# Patient Record
Sex: MALE | Race: WHITE | NOT HISPANIC OR LATINO | URBAN - METROPOLITAN AREA
[De-identification: names, ages, dates, MRNs, and addresses within clinical notes are randomized per-mention and may not be internally consistent; named-entity substitution may affect disease eponyms.]

---

## 2017-01-31 ENCOUNTER — IMPORTED ENCOUNTER (OUTPATIENT)
Dept: URBAN - METROPOLITAN AREA CLINIC 9 | Facility: CLINIC | Age: 77
End: 2017-01-31

## 2017-06-14 ENCOUNTER — IMPORTED ENCOUNTER (OUTPATIENT)
Dept: URBAN - METROPOLITAN AREA CLINIC 9 | Facility: CLINIC | Age: 77
End: 2017-06-14

## 2017-12-06 ENCOUNTER — IMPORTED ENCOUNTER (OUTPATIENT)
Dept: URBAN - METROPOLITAN AREA CLINIC 9 | Facility: CLINIC | Age: 77
End: 2017-12-06

## 2018-02-27 ENCOUNTER — IMPORTED ENCOUNTER (OUTPATIENT)
Dept: URBAN - METROPOLITAN AREA CLINIC 9 | Facility: CLINIC | Age: 78
End: 2018-02-27

## 2018-03-13 ENCOUNTER — IMPORTED ENCOUNTER (OUTPATIENT)
Dept: URBAN - METROPOLITAN AREA CLINIC 9 | Facility: CLINIC | Age: 78
End: 2018-03-13

## 2018-03-28 ENCOUNTER — IMPORTED ENCOUNTER (OUTPATIENT)
Dept: URBAN - METROPOLITAN AREA CLINIC 9 | Facility: CLINIC | Age: 78
End: 2018-03-28

## 2018-12-12 ENCOUNTER — IMPORTED ENCOUNTER (OUTPATIENT)
Dept: URBAN - METROPOLITAN AREA CLINIC 9 | Facility: CLINIC | Age: 78
End: 2018-12-12

## 2019-12-11 ENCOUNTER — IMPORTED ENCOUNTER (OUTPATIENT)
Dept: URBAN - METROPOLITAN AREA CLINIC 9 | Facility: CLINIC | Age: 79
End: 2019-12-11

## 2020-08-26 ENCOUNTER — IMPORTED ENCOUNTER (OUTPATIENT)
Dept: URBAN - METROPOLITAN AREA CLINIC 9 | Facility: CLINIC | Age: 80
End: 2020-08-26

## 2021-09-01 ENCOUNTER — IMPORTED ENCOUNTER (OUTPATIENT)
Dept: URBAN - METROPOLITAN AREA CLINIC 9 | Facility: CLINIC | Age: 81
End: 2021-09-01

## 2021-10-05 ENCOUNTER — PRE-OP/H&P (OUTPATIENT)
Dept: URBAN - METROPOLITAN AREA CLINIC 14 | Facility: CLINIC | Age: 81
End: 2021-10-05

## 2021-10-05 DIAGNOSIS — H25.13: ICD-10-CM

## 2021-10-05 PROCEDURE — 92136 OPHTHALMIC BIOMETRY: CPT

## 2021-10-05 PROCEDURE — 99211PRE PRE OP VISIT

## 2021-10-19 ASSESSMENT — VISUAL ACUITY
OD_SC: 20/50 -2 SN
OS_CC: 20/30 -2 SN
OS_CC: 20/25 SN
OS_CC: 20/25 + SN
OD_CC: 20/30 SN
OD_CC: 20/25 SN
OS_SC: 20/80 SN
OS_CC: 20/25 SN
OS_CC: 20/30 + SN
OS_CC: 20/20 -2 SN
OS_SC: 20/20 - SN
OS_SC: 20/25 SN
OS_CC: 20/30 -2 SN
OD_CC: 20/20 -2 SN
OD_CC: 20/25 - SN
OD_SC: 20/60 SN
OD_CC: 20/20 SN
OD_CC: 20/40 SN
OD_SC: 20/400 SN
OD_SC: 20/60 SN
OD_CC: 20/60 SN
OD_CC: 20/25 + SN
OD_SC: 20/50 -2 SN
OD_PH: 20/20 SN
OS_SC: 20/20 - SN
OS_SC: 20/25 SN
OS_CC: 20/30 + SN
OS_CC: 20/40 SN
OS_CC: 20/20 -2 SN
OD_CC: 20/30 SN
OS_PH: 20/20 - SN
OD_CC: 20/30 SN

## 2021-10-19 ASSESSMENT — KERATOMETRY
OD_AXISANGLE2_DEGREES: 177
OD_AXISANGLE_DEGREES: 87
OD_K2POWER_DIOPTERS: 43.25
OS_AXISANGLE_DEGREES: 180
OD_K1POWER_DIOPTERS: 42
OS_AXISANGLE2_DEGREES: 90
OS_K1POWER_DIOPTERS: 42.75
OS_K2POWER_DIOPTERS: 42.75

## 2021-10-19 ASSESSMENT — TONOMETRY
OD_IOP_MMHG: 14
OS_IOP_MMHG: 11
OS_IOP_MMHG: 15
OS_IOP_MMHG: 15
OD_IOP_MMHG: 16
OS_IOP_MMHG: 15
OD_IOP_MMHG: 18
OS_IOP_MMHG: 19
OD_IOP_MMHG: 14
OS_IOP_MMHG: 18
OD_IOP_MMHG: 17
OS_IOP_MMHG: 13
OS_IOP_MMHG: 18
OD_IOP_MMHG: 15
OD_IOP_MMHG: 18
OD_IOP_MMHG: 14

## 2021-10-28 NOTE — PATIENT DISCUSSION
Refractive Counseling: I have discussed the options for refractive surgery to decrease dependency on glasses and/or contact lenses. These options include LASIK, PRK / ASA, ICL, RLE. It was emphasized to the patient that the goal of reducing spectacle dependence not spectacle freedom is more realistic. The patient understands it is possible they will still need a weak spectacle prescription and/or a LASIK enhancement to achieve visual target.

## 2021-11-05 ENCOUNTER — 1 DAY POST-OP (OUTPATIENT)
Dept: URBAN - METROPOLITAN AREA CLINIC 9 | Facility: CLINIC | Age: 81
End: 2021-11-05

## 2021-11-05 DIAGNOSIS — Z96.1: ICD-10-CM

## 2021-11-05 DIAGNOSIS — H25.12: ICD-10-CM

## 2021-11-05 PROCEDURE — 99024 POSTOP FOLLOW-UP VISIT: CPT

## 2021-11-05 PROCEDURE — 92136 OPHTHALMIC BIOMETRY: CPT

## 2021-11-05 ASSESSMENT — TONOMETRY: OD_IOP_MMHG: 20

## 2021-11-05 ASSESSMENT — VISUAL ACUITY: OD_SC: 20/20-1

## 2021-11-11 NOTE — PATIENT DISCUSSION
It was discussed that after refractive surgery, a certain number of patients experience glare, a star-bursting or halo effect around lights, or other low-light vision problems that may interfere with the ability to drive at night or see well in dim light. Although there are several possible causes for these difficulties to include pupils of any size, the risk may be increased in patients with large pupils or high degrees of correction. For most patients this is a temporary condition that diminishes with time or is correctable by wearing glasses at night or taking eye drops. For some patients, however, these visual problems are permanent.

## 2021-11-11 NOTE — PATIENT DISCUSSION
Discussed options of refractive surgery vs. glasses/contacts vs. follow. Patient understands options and desires to proceed with LASIK OU to reduce dependency on glasses.

## 2021-11-17 NOTE — PATIENT DISCUSSION
Good postoperative appearance. Graft Cartilage Fenestration Text: The cartilage was fenestrated with a 2mm punch biopsy to help facilitate graft survival and healing.

## 2021-11-19 ENCOUNTER — 1 DAY POST-OP (OUTPATIENT)
Dept: URBAN - METROPOLITAN AREA CLINIC 9 | Facility: CLINIC | Age: 81
End: 2021-11-19

## 2021-11-19 DIAGNOSIS — Z98.890: ICD-10-CM

## 2021-11-19 PROCEDURE — 99024 POSTOP FOLLOW-UP VISIT: CPT

## 2021-11-19 ASSESSMENT — VISUAL ACUITY
OS_SC: 20/20-2
OD_SC: 20/20

## 2021-11-19 ASSESSMENT — TONOMETRY
OS_IOP_MMHG: 20
OD_IOP_MMHG: 11

## 2021-12-08 ENCOUNTER — POST-OP (OUTPATIENT)
Dept: URBAN - METROPOLITAN AREA CLINIC 14 | Facility: CLINIC | Age: 81
End: 2021-12-08

## 2021-12-08 DIAGNOSIS — Z98.890: ICD-10-CM

## 2021-12-08 PROCEDURE — 99024 POSTOP FOLLOW-UP VISIT: CPT

## 2021-12-08 ASSESSMENT — TONOMETRY
OS_IOP_MMHG: 9
OD_IOP_MMHG: 8

## 2021-12-08 ASSESSMENT — VISUAL ACUITY
OS_SC: 20/20-2
OD_SC: 20/20

## 2022-06-14 ENCOUNTER — ESTABLISHED PATIENT (OUTPATIENT)
Dept: URBAN - METROPOLITAN AREA CLINIC 14 | Facility: CLINIC | Age: 82
End: 2022-06-14

## 2022-06-14 DIAGNOSIS — H04.123: ICD-10-CM

## 2022-06-14 DIAGNOSIS — H35.3131: ICD-10-CM

## 2022-06-14 DIAGNOSIS — H43.812: ICD-10-CM

## 2022-06-14 PROCEDURE — 92134 CPTRZ OPH DX IMG PST SGM RTA: CPT

## 2022-06-14 PROCEDURE — 92014 COMPRE OPH EXAM EST PT 1/>: CPT

## 2022-06-14 ASSESSMENT — VISUAL ACUITY
OU_SC: 20/20-2
OS_CC: 20/25+0
OD_CC: 20/25

## 2022-06-14 ASSESSMENT — TONOMETRY
OD_IOP_MMHG: 14
OS_IOP_MMHG: 14

## 2022-06-19 RX ORDER — OLMESARTAN MEDOXOMIL 40 MG/1
TABLET ORAL
COMMUNITY

## 2022-06-19 RX ORDER — FINASTERIDE 5 MG/1
TABLET, FILM COATED ORAL
COMMUNITY

## 2022-06-19 RX ORDER — PRAVASTATIN SODIUM 20 MG
TABLET ORAL
COMMUNITY

## 2022-06-19 RX ORDER — TAMSULOSIN HYDROCHLORIDE 0.4 MG/1
CAPSULE ORAL
COMMUNITY

## 2022-06-19 RX ORDER — FLUTICASONE PROPIONATE 50 MCG
SPRAY, SUSPENSION (ML) NASAL
COMMUNITY

## 2022-06-19 RX ORDER — ROPINIROLE 0.25 MG/1
TABLET, FILM COATED ORAL
COMMUNITY

## 2022-06-28 RX ORDER — ZOLPIDEM TARTRATE 5 MG/1
TABLET ORAL
COMMUNITY

## 2022-10-04 PROBLEM — M75.101 ROTATOR CUFF SYNDROME OF RIGHT SHOULDER: Status: ACTIVE | Noted: 2022-10-04

## 2022-10-04 PROBLEM — Z96.612 STATUS POST SHOULDER REPLACEMENT, LEFT: Status: ACTIVE | Noted: 2022-10-04

## 2022-10-13 PROBLEM — I77.9 DISORDER OF CAROTID ARTERY (HCC): Status: ACTIVE | Noted: 2022-10-13

## 2022-10-13 PROBLEM — J30.0 VASOMOTOR RHINITIS: Status: ACTIVE | Noted: 2022-10-13

## 2022-10-13 PROBLEM — C44.90 SKIN CANCER: Status: ACTIVE | Noted: 2018-10-30

## 2022-10-13 PROBLEM — Z96.612 STATUS POST REVERSE TOTAL ARTHROPLASTY OF LEFT SHOULDER: Status: ACTIVE | Noted: 2022-10-13

## 2022-10-13 PROBLEM — I44.7 LBBB (LEFT BUNDLE BRANCH BLOCK): Status: ACTIVE | Noted: 2017-06-20

## 2022-10-13 PROBLEM — Z97.4 DOES USE HEARING AID: Status: ACTIVE | Noted: 2022-10-13

## 2022-10-13 PROBLEM — Z97.3 WEARS GLASSES: Status: ACTIVE | Noted: 2022-10-13

## 2022-10-13 PROBLEM — F51.01 PRIMARY INSOMNIA: Status: ACTIVE | Noted: 2017-06-20

## 2022-10-13 PROBLEM — Z96.651 STATUS POST RIGHT KNEE REPLACEMENT: Status: ACTIVE | Noted: 2018-10-30

## 2022-10-13 PROBLEM — N40.0 BENIGN PROSTATIC HYPERPLASIA: Status: ACTIVE | Noted: 2017-06-20

## 2022-10-13 PROBLEM — M17.12 PRIMARY OSTEOARTHRITIS OF LEFT KNEE: Status: ACTIVE | Noted: 2022-10-13

## 2022-10-13 PROBLEM — H91.93 BILATERAL HIGH FREQUENCY HEARING LOSS: Status: ACTIVE | Noted: 2022-10-13

## 2022-10-13 PROBLEM — R73.09 INCREASED GLUCOSE LEVEL: Status: ACTIVE | Noted: 2022-10-13

## 2022-10-13 PROBLEM — G47.33 OSA (OBSTRUCTIVE SLEEP APNEA): Status: ACTIVE | Noted: 2017-06-20

## 2022-10-13 PROBLEM — I34.0 MITRAL VALVE REGURGITATION: Status: ACTIVE | Noted: 2022-10-13

## 2022-10-13 PROBLEM — M12.812 ROTATOR CUFF ARTHROPATHY OF LEFT SHOULDER: Status: ACTIVE | Noted: 2022-10-13

## 2022-10-13 PROBLEM — I10 ESSENTIAL HYPERTENSION: Status: ACTIVE | Noted: 2017-06-20

## 2022-10-13 PROBLEM — R69 DISEASE SUSPECTED: Status: ACTIVE | Noted: 2022-10-13

## 2022-10-13 PROBLEM — S46.012A TRAUMATIC COMPLETE TEAR OF LEFT ROTATOR CUFF: Status: ACTIVE | Noted: 2022-10-13

## 2022-10-13 PROBLEM — Z86.73 HISTORY OF TRANSIENT ISCHEMIC ATTACK: Status: ACTIVE | Noted: 2021-12-01

## 2022-10-13 PROBLEM — S40.012A CONTUSION OF LEFT SHOULDER: Status: ACTIVE | Noted: 2022-10-13

## 2022-10-13 PROBLEM — E78.01 FAMILIAL HYPERCHOLESTEROLEMIA: Status: ACTIVE | Noted: 2022-10-13

## 2022-10-13 PROBLEM — H04.123 DRY EYES: Status: ACTIVE | Noted: 2022-10-13

## 2022-10-13 PROBLEM — M18.9 OSTEOARTHRITIS OF CARPOMETACARPAL (CMC) JOINT OF THUMB: Status: ACTIVE | Noted: 2022-10-13

## 2022-10-13 PROBLEM — I27.20 PULMONARY HYPERTENSION (HCC): Status: ACTIVE | Noted: 2022-10-13

## 2022-10-13 PROBLEM — E78.00 HYPERCHOLESTEREMIA: Status: ACTIVE | Noted: 2017-06-20

## 2022-10-13 PROBLEM — M25.511 PAIN IN RIGHT SHOULDER: Status: ACTIVE | Noted: 2022-10-13

## 2022-10-13 PROBLEM — K21.9 GASTROESOPHAGEAL REFLUX DISEASE WITHOUT ESOPHAGITIS: Status: ACTIVE | Noted: 2017-06-20

## 2022-10-13 PROBLEM — M10.9 GOUT: Status: ACTIVE | Noted: 2022-10-13

## 2022-10-13 PROBLEM — M75.41 IMPINGEMENT SYNDROME OF RIGHT SHOULDER: Status: ACTIVE | Noted: 2022-10-13

## 2022-10-13 PROBLEM — M75.02 ADHESIVE CAPSULITIS OF LEFT SHOULDER: Status: ACTIVE | Noted: 2022-10-13

## 2022-10-13 PROBLEM — M25.562 PAIN IN LEFT KNEE: Status: ACTIVE | Noted: 2022-10-13

## 2022-10-13 PROBLEM — Z00.00 HEALTHCARE MAINTENANCE: Status: ACTIVE | Noted: 2017-06-20

## 2022-10-13 PROBLEM — Z87.2 HISTORY OF ACTINIC KERATOSIS: Status: ACTIVE | Noted: 2022-10-13

## 2022-10-13 PROBLEM — J30.9 ALLERGIC RHINITIS: Status: ACTIVE | Noted: 2022-10-13

## 2022-10-13 PROBLEM — M12.811 OTHER SPECIFIC ARTHROPATHIES, NOT ELSEWHERE CLASSIFIED, RIGHT SHOULDER: Status: ACTIVE | Noted: 2022-10-13

## 2022-10-13 PROBLEM — G25.81 RESTLESS LEGS SYNDROME: Status: ACTIVE | Noted: 2022-10-13

## 2023-11-15 ENCOUNTER — APPOINTMENT (OUTPATIENT)
Dept: URBAN - METROPOLITAN AREA SURGERY 15 | Age: 83
Setting detail: DERMATOLOGY
End: 2023-11-16

## 2023-11-15 PROBLEM — C44.222 SQUAMOUS CELL CARCINOMA OF SKIN OF RIGHT EAR AND EXTERNAL AURICULAR CANAL: Status: ACTIVE | Noted: 2023-11-15

## 2023-11-15 PROCEDURE — 17311 MOHS 1 STAGE H/N/HF/G: CPT

## 2023-11-15 PROCEDURE — OTHER MOHS SURGERY: OTHER

## 2023-11-15 PROCEDURE — 13152 CMPLX RPR E/N/E/L 2.6-7.5 CM: CPT

## 2023-11-15 PROCEDURE — OTHER COUNSELING: OTHER

## 2023-11-15 PROCEDURE — OTHER SURGICAL DECISION MAKING: OTHER

## 2023-11-15 NOTE — PROCEDURE: MOHS SURGERY
Body Location Override (Optional - Billing Will Still Be Based On Selected Body Map Location If Applicable): right tragus

## 2023-11-15 NOTE — PROCEDURE: MOHS SURGERY
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  If clear margins were not obtained, patient was advised to considered 35%TCA followed by topical 5FU once fully healed.

## 2023-11-15 NOTE — PROCEDURE: MOHS SURGERY
Statement Selected Statement Selected Statement Selected Double Z Plasty Text: The lesion was extirpated to the level of the fat with a #15 scalpel blade. Given the location of the defect, shape of the defect and the proximity to free margins a double Z-plasty was deemed most appropriate for repair. Using a sterile surgical marker, the appropriate transposition arms of the double Z-plasty were drawn incorporating the defect and placing the expected incisions within the relaxed skin tension lines where possible. The area thus outlined was incised deep to adipose tissue with a #15 scalpel blade. The skin margins were undermined to an appropriate distance in all directions utilizing iris scissors. The opposing transposition arms were then transposed and carried over into place in opposite direction and anchored with interrupted buried subcutaneous sutures.

## 2023-11-15 NOTE — PROCEDURE: SURGICAL DECISION MAKING
Date Of Surgery - Today Or Tomorrow?: today
Initial Decision For Surgery?: No
Identified Risk Factors Documented?: yes
Discussion: After the mohs procedure was complete, a separate and distinct evaluation and management was performed for the resultant surgical defect for potential reconstruction using flap or graft.  Complications and risk of morbidity of each were discussed including (but not limited to) scarring, which could distort a free anatomic margin of the eyelid, nose, ear or lip.
Complexity (Necessary For Coding; Major - 90 Day Global With Some Exceptions; Minor - 10 Day Global): major
Risk Assessment Explanation (Does Not Render In The Note): Clinical determination of the probability and/or consequences of an event, such as surgery. Clinical assessment of the level of risk is affected by the nature of the event under consideration for the patient. Modifier 57 is used to indicate an Evaluation and Management (E/M) service resulted in the initial decision to perform surgery either the day before a major surgery (90 day global) or the day of a major surgery.

## 2023-11-29 ENCOUNTER — ESTABLISHED PATIENT (OUTPATIENT)
Dept: URBAN - METROPOLITAN AREA CLINIC 14 | Facility: CLINIC | Age: 83
End: 2023-11-29

## 2023-11-29 DIAGNOSIS — H43.812: ICD-10-CM

## 2023-11-29 DIAGNOSIS — H18.513: ICD-10-CM

## 2023-11-29 DIAGNOSIS — H35.3131: ICD-10-CM

## 2023-11-29 DIAGNOSIS — H04.123: ICD-10-CM

## 2023-11-29 PROCEDURE — 99214 OFFICE O/P EST MOD 30 MIN: CPT

## 2023-11-29 PROCEDURE — 92015 DETERMINE REFRACTIVE STATE: CPT

## 2023-11-29 PROCEDURE — 92134 CPTRZ OPH DX IMG PST SGM RTA: CPT

## 2023-11-29 ASSESSMENT — TONOMETRY
OS_IOP_MMHG: 14
OD_IOP_MMHG: 15

## 2023-11-29 ASSESSMENT — VISUAL ACUITY
OS_SC: 20/25-1
OD_SC: 20/30-2
OU_SC: 20/25-1

## 2024-12-04 ENCOUNTER — COMPREHENSIVE EXAM (OUTPATIENT)
Age: 84
End: 2024-12-04

## 2024-12-04 DIAGNOSIS — Z98.890: ICD-10-CM

## 2024-12-04 DIAGNOSIS — H35.3131: ICD-10-CM

## 2024-12-04 DIAGNOSIS — H43.812: ICD-10-CM

## 2024-12-04 DIAGNOSIS — H18.513: ICD-10-CM

## 2024-12-04 DIAGNOSIS — H04.123: ICD-10-CM

## 2024-12-04 PROCEDURE — 92134 CPTRZ OPH DX IMG PST SGM RTA: CPT

## 2024-12-04 PROCEDURE — 92014 COMPRE OPH EXAM EST PT 1/>: CPT

## 2024-12-04 PROCEDURE — 92015 DETERMINE REFRACTIVE STATE: CPT

## 2025-01-19 NOTE — PROCEDURE: MOHS SURGERY
(V1) none Scc Poorly Differentiated Histology Text: There were aggregates of poorly-differentiated squamous cells.